# Patient Record
Sex: MALE | Race: WHITE | NOT HISPANIC OR LATINO | Employment: FULL TIME | URBAN - METROPOLITAN AREA
[De-identification: names, ages, dates, MRNs, and addresses within clinical notes are randomized per-mention and may not be internally consistent; named-entity substitution may affect disease eponyms.]

---

## 2017-09-20 ENCOUNTER — OFFICE VISIT (OUTPATIENT)
Dept: FAMILY MEDICINE CLINIC | Facility: CLINIC | Age: 42
End: 2017-09-20

## 2017-09-20 VITALS
TEMPERATURE: 98.5 F | SYSTOLIC BLOOD PRESSURE: 154 MMHG | HEART RATE: 87 BPM | HEIGHT: 70 IN | WEIGHT: 206 LBS | DIASTOLIC BLOOD PRESSURE: 78 MMHG | OXYGEN SATURATION: 97 % | BODY MASS INDEX: 29.49 KG/M2

## 2017-09-20 DIAGNOSIS — Z00.00 ROUTINE GENERAL MEDICAL EXAMINATION AT A HEALTH CARE FACILITY: ICD-10-CM

## 2017-09-20 DIAGNOSIS — F17.200 SMOKING: ICD-10-CM

## 2017-09-20 DIAGNOSIS — I10 ESSENTIAL HYPERTENSION: Primary | ICD-10-CM

## 2017-09-20 PROCEDURE — 99406 BEHAV CHNG SMOKING 3-10 MIN: CPT | Performed by: NURSE PRACTITIONER

## 2017-09-20 PROCEDURE — 99203 OFFICE O/P NEW LOW 30 MIN: CPT | Performed by: NURSE PRACTITIONER

## 2017-09-20 RX ORDER — CEPHRADINE 500 MG
2000 CAPSULE ORAL DAILY
COMMUNITY
End: 2022-02-09

## 2017-09-20 NOTE — PROGRESS NOTES
Subjective   Guy Sanchez is a 42 y.o. male. Establish care. He is here to discuss hypertension. He was taking lisinopril but has not taking in about a year because he has not had a new PCP due to moving. He thinks his blood pressure stays down most of the time but today it's a little high. He lives a mostly healthy lifestyle and tries to eat pretty well. He does eat a lot of protein because he does work out pretty intensely. He just recently added cardio back into his routine. He does take Creatine and L-Arginine to help bulk up a little. He is at the weight now he prefers so is ok to stop those now. He does drink a lot of water.   Does smoke and drinks occasional alcohol.     Hypertension   This is a new problem. The problem is unchanged. The problem is uncontrolled. Pertinent negatives include no blurred vision, headaches, peripheral edema or shortness of breath. Associated agents: Creatine. Risk factors for coronary artery disease include male gender, family history and smoking/tobacco exposure. Past treatments include ACE inhibitors. There are no compliance problems.         The following portions of the patient's history were reviewed and updated as appropriate: allergies, current medications, past family history, past medical history, past social history, past surgical history and problem list.    Review of Systems   Constitutional: Negative.    HENT: Negative.    Eyes: Negative.  Negative for blurred vision.   Respiratory: Negative.  Negative for shortness of breath.    Cardiovascular: Negative.    Gastrointestinal: Negative.    Endocrine: Negative.    Genitourinary:        Hx: Kidney stones     Musculoskeletal:        Occasional bilateral shoulder pain but lifts weights and plays volleyball   Skin: Negative.    Allergic/Immunologic: Negative.    Neurological: Negative.  Negative for headaches.   Hematological: Negative.    Psychiatric/Behavioral: Negative.        Objective   Physical Exam   Constitutional:  "He is oriented to person, place, and time. Vital signs are normal. He appears well-developed and well-nourished. He is cooperative.   HENT:   Right Ear: Hearing and tympanic membrane normal.   Left Ear: Hearing and tympanic membrane normal.   Nose: Nose normal.   Mouth/Throat: Uvula is midline, oropharynx is clear and moist and mucous membranes are normal.   Eyes: Conjunctivae, EOM and lids are normal. Pupils are equal, round, and reactive to light.   Neck: Trachea normal and full passive range of motion without pain. No thyroid mass and no thyromegaly present.   Cardiovascular: Normal rate, regular rhythm, normal heart sounds, intact distal pulses and normal pulses.    Pulmonary/Chest: Effort normal and breath sounds normal.   Abdominal: Soft. Normal appearance and bowel sounds are normal.   Musculoskeletal: Normal range of motion.   Neurological: He is alert and oriented to person, place, and time. He has normal strength.   Skin: Skin is warm, dry and intact.   Psychiatric: He has a normal mood and affect. His speech is normal and behavior is normal. Judgment and thought content normal. Cognition and memory are normal.   Nursing note and vitals reviewed.    Vitals:    09/20/17 1000   BP: 154/78   Pulse: 87   Temp: 98.5 °F (36.9 °C)   TempSrc: Oral   SpO2: 97%   Weight: 206 lb (93.4 kg)   Height: 70\" (177.8 cm)       Assessment/Plan   Diagnoses and all orders for this visit:    Essential hypertension    Routine general medical examination at a health care facility  -     CBC & Differential  -     Comprehensive Metabolic Panel  -     Hemoglobin A1c  -     Lipid Panel  -     TSH  -     Vitamin D 25 Hydroxy    Smoking      Hypertension: Will check some labs. We have discussed treatment. For now he will monitor the blood pressure, stop the creatine, cardio exercise and call the results of monitoring back to the office in about 30 days. If blood pressure improves, will stay off the medication. If elevated still, will " restart the Lisinopril.   He was encouraged to stop smoking but admits he is not ready to quit. Discussed smoking cessation and methods for quitting.  The patient is sincerely urged to quit smoking. The numerous direct health benefits are discussed. If he decides to quit, there are a number of helpful adjunctive aids, and he can see me to discuss nicotine replacement therapy and bupropion anytime in the future.  He had negative Hep C screening about 8 years ago so no need to repeat.  Follow up in one month

## 2020-01-21 ENCOUNTER — OFFICE VISIT CONVERTED (OUTPATIENT)
Dept: FAMILY MEDICINE CLINIC | Age: 45
End: 2020-01-21
Attending: NURSE PRACTITIONER

## 2020-01-24 ENCOUNTER — HOSPITAL ENCOUNTER (OUTPATIENT)
Dept: OTHER | Facility: HOSPITAL | Age: 45
Discharge: HOME | End: 2020-01-24
Attending: NURSE PRACTITIONER

## 2020-02-26 ENCOUNTER — OFFICE VISIT CONVERTED (OUTPATIENT)
Dept: FAMILY MEDICINE CLINIC | Age: 45
End: 2020-02-26
Attending: NURSE PRACTITIONER

## 2021-05-18 NOTE — PROGRESS NOTES
Guy Sanchez  1975     Office/Outpatient Visit    Visit Date: Wed, Feb 26, 2020 01:25 pm    Provider: Heaven Greenwood N.P. (Assistant: Christie Sears MA)    Location: [facility]        Electronically signed by Heaven Greenwood N.P. on  02/27/2020 01:11:27 PM                             Subjective:        CC: Mr. Sanchez is a 44 year old White male.  release statement for work PT STATES HE ISNT TAKING BACLOFEN         HPI:           onset about 6 wks ago.  no specific injury.  is attending PT (last visit this am).  has improved slowly.  muscle relaxers never helped.  is ready to return to work monday.  needs not to return to work.  I have talked with washington feliciano with [facility] who states in her opinion ok to return to work next week.  if starts to worsen again then MRI cervical spine would be recommended.  pt is agreeable with plan.  job involves heavy lifting at times and no light duty option for his work place.            Essential (primary) hypertension details; not currently on tx.  tx in the past with lisinorpril/ hctz per dr veras and did well in the past on that medication.      ROS:     CONSTITUTIONAL:  Negative for chills, fatigue, fever, and weight change.      CARDIOVASCULAR:  Negative for chest pain, palpitations, tachycardia, orthopnea, and edema.      RESPIRATORY:  Negative for cough, dyspnea, and hemoptysis.      MUSCULOSKELETAL:  Positive for arthralgias (neck improving).      NEUROLOGICAL:  Positive for paresthesia ( left arm and hand greatly improved ).      PSYCHIATRIC:  Negative for anxiety, depression, and sleep disturbances.          Past Medical History / Family History / Social History:         Last Reviewed on 1/21/2020 08:05 PM by Heaven Greenwood    Past Medical History:             PAST MEDICAL HISTORY         Hypertension         Surgical History:         Tonsillectomy/Adenoidectomy     Pressure Equalization Tubes         Social History:     Occupation: [employer]     Marital Status:      Children: 1 child         Tobacco/Alcohol/Supplements:     Last Reviewed on 1/21/2020 05:57 PM by Meenu Allen    Tobacco: Current Smoker: He currently smokes every day, 1 pack per day.          Current Problems:     Last Reviewed on 1/21/2020 08:05 PM by Heaven Greenwood    Cervicalgia    Encounter for screening for depression        Immunizations:     influenza, injectable, quadrivalent, preservative free 10/16/2019        Current Medications:     Last Reviewed on 2/26/2020 01:25 PM by Christie Sears    baclofen 10 mg oral tablet [take 1 tablet (10 mg) by oral route 3 times per day prn muscle pain]    etodolac 200 mg oral capsule [take 1 capsule (200 mg) by oral route 2 times per day with food]        Objective:        Vitals:         Historical:     1/21/2020  BP:   170/92 mm Hg ( (left arm, , sitting, );) 1/21/2020  BP:   168/96 mm Hg ( (right arm, , sitting, );) 1/21/2020  Wt:   209.4lbs    Current: 2/26/2020 1:30:36 PM    Ht:  5 ft, 10.5 in;  Wt: 210.6 lbs;  BMI: 29.8T: 98.1 F (oral);  BP: 156/98 mm Hg (right arm, sitting);  P: 75 bpm (right arm (BP Cuff), sitting)        Repeat:     1:31:19 PM  BP:   158/96mm Hg (right arm, sitting, P-75)     Exams:     PHYSICAL EXAM:     GENERAL:  well developed and nourished; appropriately groomed; in no apparent distress;     RESPIRATORY: normal respiratory rate and pattern with no distress; normal breath sounds with no rales, rhonchi, wheezes or rubs;     CARDIOVASCULAR: normal rate; rhythm is regular;  no edema;     MUSCULOSKELETAL:  Normal range of motion, strength and tone;     NEUROLOGIC: mental status: alert and oriented x 3; GROSSLY INTACT     PSYCHIATRIC:  appropriate affect and demeanor; normal speech pattern; grossly normal memory;         Assessment:         M54.2   Cervicalgia       I10   Essential (primary) hypertension           ORDERS:         Meds Prescribed:       [New Rx] lisinopriL-hydrochlorothiazide 10-12.5  mg oral tablet [take 1 tablet by oral route once daily], #30 (thirty) tablets, Refills: 3 (three)                 Plan: GLUCOSE/ CHOLESTEROL CHECK AT WORK ONCE PER YEAR        Cervicalgiareturn to work monday with no restrictions. lj        RECOMMENDATIONS given include: he will call if symptoms increase and will consider MRI cervical spine or further time off work prn..          Essential (primary) hypertension        RECOMMENDATIONS given include: avoid pseudoephedrine or other stimulants/decongestants in common cold remedies, decrease consumption of alcohol, perform routine monitoring of blood pressure with home blood pressure cuff, exercise, reduction of dietary salt intake, and take medication as prescribed, try not to miss doses.      FOLLOW-UP: Schedule a follow-up visit in 1 month.            Prescriptions:       [New Rx] lisinopriL-hydrochlorothiazide 10-12.5 mg oral tablet [take 1 tablet by oral route once daily], #30 (thirty) tablets, Refills: 3 (three)             Patient Recommendations:        For  Essential (primary) hypertension:    Certain decongestants and stimulants (like pseudoephedrine) in common cold remedies raise blood pressure, sometimes to dangerously high levels. Never take with MAO inhibitors! Avoid alcohol as it can contribute to elevated blood pressure. Begin monitoring your blood pressure by brief nurse visits at our office, a home blood pressure monitor, or by checking on the machines in pharmacies or stores.  Keep a log of the readings. Maintain a regular exercise program. Reduce the amount of salt in your diet.  Schedule a follow-up visit in 1 month.              Charge Capture:         Primary Diagnosis:     M54.2  Cervicalgia           Orders:      46551  Office/outpatient visit; established patient, level 3  (In-House)              I10  Essential (primary) hypertension
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
## 2021-07-02 VITALS
TEMPERATURE: 98.1 F | HEIGHT: 71 IN | BODY MASS INDEX: 29.48 KG/M2 | WEIGHT: 210.6 LBS | DIASTOLIC BLOOD PRESSURE: 96 MMHG | HEART RATE: 75 BPM | SYSTOLIC BLOOD PRESSURE: 158 MMHG

## 2021-07-02 VITALS
TEMPERATURE: 97.3 F | DIASTOLIC BLOOD PRESSURE: 92 MMHG | SYSTOLIC BLOOD PRESSURE: 170 MMHG | HEIGHT: 71 IN | HEART RATE: 80 BPM | BODY MASS INDEX: 29.31 KG/M2 | WEIGHT: 209.4 LBS

## 2022-02-09 ENCOUNTER — HOSPITAL ENCOUNTER (OUTPATIENT)
Dept: GENERAL RADIOLOGY | Facility: HOSPITAL | Age: 47
Discharge: HOME | End: 2022-02-09
Admitting: NURSE PRACTITIONER

## 2022-02-09 ENCOUNTER — OFFICE VISIT (OUTPATIENT)
Dept: FAMILY MEDICINE CLINIC | Facility: CLINIC | Age: 47
End: 2022-02-09

## 2022-02-09 VITALS
SYSTOLIC BLOOD PRESSURE: 180 MMHG | DIASTOLIC BLOOD PRESSURE: 107 MMHG | HEART RATE: 82 BPM | BODY MASS INDEX: 30.92 KG/M2 | WEIGHT: 216 LBS | HEIGHT: 70 IN | OXYGEN SATURATION: 99 %

## 2022-02-09 DIAGNOSIS — Z76.89 ESTABLISHING CARE WITH NEW DOCTOR, ENCOUNTER FOR: Primary | ICD-10-CM

## 2022-02-09 DIAGNOSIS — Z72.0 TOBACCO ABUSE: ICD-10-CM

## 2022-02-09 DIAGNOSIS — Z11.59 NEED FOR HEPATITIS C SCREENING TEST: ICD-10-CM

## 2022-02-09 DIAGNOSIS — Z01.89 ROUTINE LAB DRAW: ICD-10-CM

## 2022-02-09 DIAGNOSIS — Z13.29 SCREENING FOR THYROID DISORDER: ICD-10-CM

## 2022-02-09 DIAGNOSIS — I10 ESSENTIAL HYPERTENSION: ICD-10-CM

## 2022-02-09 DIAGNOSIS — R31.9 HEMATURIA, UNSPECIFIED TYPE: ICD-10-CM

## 2022-02-09 DIAGNOSIS — Z12.5 SCREENING FOR PROSTATE CANCER: ICD-10-CM

## 2022-02-09 DIAGNOSIS — Z13.220 SCREENING FOR LIPID DISORDERS: ICD-10-CM

## 2022-02-09 LAB
BILIRUB BLD-MCNC: NEGATIVE MG/DL
CLARITY, POC: ABNORMAL
COLOR UR: ABNORMAL
EXPIRATION DATE: ABNORMAL
GLUCOSE UR STRIP-MCNC: NEGATIVE MG/DL
KETONES UR QL: NEGATIVE
LEUKOCYTE EST, POC: ABNORMAL
Lab: ABNORMAL
NITRITE UR-MCNC: NEGATIVE MG/ML
PH UR: 6.5 [PH] (ref 5–8)
PROT UR STRIP-MCNC: ABNORMAL MG/DL
RBC # UR STRIP: ABNORMAL /UL
SP GR UR: 1.02 (ref 1–1.03)
UROBILINOGEN UR QL: NORMAL

## 2022-02-09 PROCEDURE — 87086 URINE CULTURE/COLONY COUNT: CPT | Performed by: NURSE PRACTITIONER

## 2022-02-09 PROCEDURE — 74018 RADEX ABDOMEN 1 VIEW: CPT

## 2022-02-09 PROCEDURE — 81003 URINALYSIS AUTO W/O SCOPE: CPT | Performed by: NURSE PRACTITIONER

## 2022-02-09 PROCEDURE — 99204 OFFICE O/P NEW MOD 45 MIN: CPT | Performed by: NURSE PRACTITIONER

## 2022-02-09 RX ORDER — MULTIVIT WITH MINERALS/LUTEIN
1000 TABLET ORAL DAILY
COMMUNITY

## 2022-02-09 RX ORDER — NITROFURANTOIN 25; 75 MG/1; MG/1
100 CAPSULE ORAL 2 TIMES DAILY
Qty: 14 CAPSULE | Refills: 0 | Status: SHIPPED | OUTPATIENT
Start: 2022-02-09

## 2022-02-09 RX ORDER — LISINOPRIL 10 MG/1
10 TABLET ORAL DAILY
Qty: 30 TABLET | Refills: 1 | Status: SHIPPED | OUTPATIENT
Start: 2022-02-09

## 2022-02-09 NOTE — PROGRESS NOTES
Chief Complaint  Establish Care, Blood in Urine (aches on left lower back ), and Hypertension (Patient is currently not on medication for this. He used to take Lisinopril )    Subjective          Guy JULIA Sanchez Jr presents to [facility]  History of Present Illness  Establish Care,       Hypertension (Patient is currently not on medication for this. He used to take Lisinopril. He does check his b/p 1-2 times a week 140-150s/90s. He denies any dizziness, chest pain, headache or edema.     Tobacco abuse-currently smoking 1ppd for 20 years.     Hematuria-pt noticed blood in the urine about a week ago. Pt does c/o pain on the right flank area. He denies any dysuria. He does report frequency, he denies any urgency or trouble with stream. He does have a history of kidney stones times 2 which he past.pt states he did take two days worth of antibiotics (4pills daily) patient does admit that he was drinking a lot of Monster energy drinks and coffee.  He has since switched over to water.  He states his urine is looking better however he is still having the left flank pain.          Past Medical History:   Diagnosis Date   • Hypertension    • Kidney stones          No Known Allergies       History reviewed. No pertinent surgical history.       Social History     Tobacco Use   • Smoking status: Current Every Day Smoker     Packs/day: 1.00     Years: 20.00     Pack years: 20.00     Types: Cigarettes   • Smokeless tobacco: Never Used   Substance Use Topics   • Alcohol use: Yes     Comment: occasionally          Family History   Problem Relation Age of Onset   • Hypertension Father    • Hypertension Paternal Grandmother    • Heart attack Paternal Grandfather           Current Outpatient Medications on File Prior to Visit   Medication Sig   • ascorbic acid (VITAMIN C) 1000 MG tablet Take 1,000 mg by mouth Daily.   • [DISCONTINUED] CREATINE PO Take 1 tablet by mouth Daily.   • [DISCONTINUED]  "L-Arginine 1000 MG tablet Take 2,000 mg by mouth Daily.     No current facility-administered medications on file prior to visit.           There is no immunization history on file for this patient.      BP (!) 180/107   Pulse 82   Ht 177.8 cm (70\")   Wt 98 kg (216 lb)   SpO2 99%   BMI 30.99 kg/m²             Physical Exam  Vitals reviewed.   Constitutional:       Appearance: Normal appearance. He is well-developed.   HENT:      Head: Normocephalic and atraumatic.      Right Ear: External ear normal.      Left Ear: External ear normal.      Mouth/Throat:      Pharynx: No oropharyngeal exudate.   Eyes:      Conjunctiva/sclera: Conjunctivae normal.      Pupils: Pupils are equal, round, and reactive to light.   Neck:      Vascular: No carotid bruit.   Cardiovascular:      Rate and Rhythm: Normal rate and regular rhythm.      Heart sounds: No murmur heard.  No friction rub. No gallop.    Pulmonary:      Effort: Pulmonary effort is normal.      Breath sounds: Normal breath sounds. No wheezing or rhonchi.   Abdominal:      Tenderness: There is left CVA tenderness.   Skin:     General: Skin is warm and dry.   Neurological:      Mental Status: He is alert and oriented to person, place, and time.      Cranial Nerves: No cranial nerve deficit.   Psychiatric:         Mood and Affect: Mood and affect normal.         Behavior: Behavior normal.         Thought Content: Thought content normal.         Judgment: Judgment normal.             Result Review :     The following data was reviewed by: DAVON Jameson on 02/09/2022:  POCT urinalysis dipstick, automated  Order: [id number]   Status: Final result     Visible to patient: No (scheduled for 2/9/2022 10:11 PM)     Next appt: None     Dx: Hematuria, unspecified type    Specimen Information: Urine         0 Result Notes    Component   Ref Range & Units 08:10   Color   Yellow, Straw, Dark Yellow, Pao Dark Yellow    Clarity, UA   Clear Cloudy Abnormal     Specific Gravity "    1.005 - 1.030 1.025    pH, Urine   5.0 - 8.0 6.5    Leukocytes   Negative Trace Abnormal     Nitrite, UA   Negative Negative    Protein, POC   Negative mg/dL 100 mg/dL Abnormal     Glucose, UA   Negative, 1000 mg/dL (3+) mg/dL Negative    Ketones, UA   Negative Negative    Urobilinogen, UA   Normal Normal    Bilirubin   Negative Negative    Blood, UA   Negative Large Abnormal     Lot Number  [id number]    Expiration Date  10/31/2022    Resulting Agency [facility]              Specimen Collected: 02/09/22 08:10 Last Resulted: 02/09/22 08:10        Lab Flowsheet     Order Details     View Encounter     Lab and Collection Details     Routing                                 Assessment and Plan      Diagnoses and all orders for this visit:    1. Establishing care with new doctor, encounter for (Primary)    2. Routine lab draw  -     Comprehensive Metabolic Panel; Future  -     CBC & Differential; Future  -     TSH; Future  -     Lipid Panel; Future    3. Screening for lipid disorders  -     Lipid Panel; Future    4. Screening for thyroid disorder  -     TSH; Future    5. Screening for prostate cancer  -     PSA Screen; Future    6. Need for hepatitis C screening test  -     Hepatitis C antibody; Future    7. Hematuria, unspecified type  Comments:  Macrobid 100 mg twice daily, side effects administration addressed.  Will check KUB.  Patient advised to follow-up in 2 weeks to recheck UA.  Consider CT scan  Orders:  -     POCT urinalysis dipstick, automated  -     Urine Culture - Urine, Urine, Clean Catch; Future  -     XR Abdomen KUB; Future  -     nitrofurantoin, macrocrystal-monohydrate, (Macrobid) 100 MG capsule; Take 1 capsule by mouth 2 (Two) Times a Day.  Dispense: 14 capsule; Refill: 0  -     Urine Culture - Urine, Urine, Clean Catch    8. Tobacco abuse    9. Essential hypertension  Comments:  Not at goal, restart lisinopril 10 mg daily.  Side effects administration addressed.  Patient  advised to monitor BP at home.  Orders:  -     lisinopril (PRINIVIL,ZESTRIL) 10 MG tablet; Take 1 tablet by mouth Daily.  Dispense: 30 tablet; Refill: 1      Guy DUMONT Wivirgie Delgadillo  reports that he has been smoking cigarettes. He has a 20.00 pack-year smoking history. He has never used smokeless tobacco.. I have educated him on the risk of diseases from using tobacco products such as cancer, COPD and heart disease.     I advised him to quit and he is not willing to quit.    I spent 10 minutes counseling the patient.               Follow Up     Return in about 6 weeks (around 3/23/2022).    Patient was given instructions and counseling regarding his condition or for health maintenance advice. Please see specific information pulled into the AVS if appropriate.

## 2022-02-11 LAB — BACTERIA SPEC AEROBE CULT: NO GROWTH

## 2022-02-14 ENCOUNTER — TELEPHONE (OUTPATIENT)
Dept: FAMILY MEDICINE CLINIC | Facility: CLINIC | Age: 47
End: 2022-02-14

## 2022-02-15 ENCOUNTER — TELEPHONE (OUTPATIENT)
Dept: FAMILY MEDICINE CLINIC | Facility: CLINIC | Age: 47
End: 2022-02-15

## 2022-02-15 DIAGNOSIS — R31.9 PAINLESS HEMATURIA: Primary | ICD-10-CM

## 2022-02-22 ENCOUNTER — TELEPHONE (OUTPATIENT)
Dept: FAMILY MEDICINE CLINIC | Facility: CLINIC | Age: 47
End: 2022-02-22

## 2022-03-01 NOTE — TELEPHONE ENCOUNTER
----- Message from DAVON Jameson sent at 2/9/2022 10:32 AM EST -----  Kidney stone noted over left kidney, nonobstructing.  Increase fluids.

## 2022-03-01 NOTE — TELEPHONE ENCOUNTER
----- Message from DAVON Jameson sent at 2/14/2022  2:00 PM EST -----  Negative urine culture, patient needs to be scheduled for a CT abdomen and pelvis with IV contrast with diagnosis of painless hematuria.

## 2022-04-22 PROCEDURE — 82365 CALCULUS SPECTROSCOPY: CPT | Performed by: UROLOGY

## 2022-04-23 ENCOUNTER — LAB REQUISITION (OUTPATIENT)
Dept: LAB | Facility: HOSPITAL | Age: 47
End: 2022-04-23

## 2022-04-23 DIAGNOSIS — N20.2 CALCULUS OF KIDNEY WITH CALCULUS OF URETER: ICD-10-CM

## 2022-04-28 LAB
CA H2 PHOS DIHYD MFR STONE: 50 %
COLOR STONE: NORMAL
COM MFR STONE: 10 %
COMPN STONE: NORMAL
HYDROXYAPATITE 24H ENGDIFF UR: 40 %
LABORATORY COMMENT REPORT: NORMAL
LABORATORY COMMENT REPORT: NORMAL
Lab: NORMAL
Lab: NORMAL
PHOTO: NORMAL
SIZE STONE: NORMAL MM
SPEC SOURCE SUBJ: NORMAL
WT STONE: 54 MG

## 2024-11-30 ENCOUNTER — HOSPITAL ENCOUNTER (EMERGENCY)
Facility: HOSPITAL | Age: 49
Discharge: HOME | End: 2024-11-30
Attending: EMERGENCY MEDICINE
Payer: COMMERCIAL

## 2024-11-30 ENCOUNTER — APPOINTMENT (OUTPATIENT)
Dept: GENERAL RADIOLOGY | Facility: HOSPITAL | Age: 49
End: 2024-11-30
Payer: COMMERCIAL

## 2024-11-30 VITALS
WEIGHT: 209.88 LBS | SYSTOLIC BLOOD PRESSURE: 188 MMHG | HEART RATE: 67 BPM | DIASTOLIC BLOOD PRESSURE: 120 MMHG | OXYGEN SATURATION: 96 % | TEMPERATURE: 98.2 F | HEIGHT: 70 IN | BODY MASS INDEX: 30.05 KG/M2 | RESPIRATION RATE: 19 BRPM

## 2024-11-30 DIAGNOSIS — I10 ESSENTIAL HYPERTENSION: ICD-10-CM

## 2024-11-30 DIAGNOSIS — I10 UNCONTROLLED HYPERTENSION: Primary | ICD-10-CM

## 2024-11-30 DIAGNOSIS — J06.9 UPPER RESPIRATORY TRACT INFECTION, UNSPECIFIED TYPE: ICD-10-CM

## 2024-11-30 LAB
FLUAV SUBTYP SPEC NAA+PROBE: NOT DETECTED
FLUBV RNA ISLT QL NAA+PROBE: NOT DETECTED
HOLD SPECIMEN: NORMAL
HOLD SPECIMEN: NORMAL
RSV RNA NPH QL NAA+NON-PROBE: NOT DETECTED
SARS-COV-2 RNA RESP QL NAA+PROBE: NOT DETECTED
WHOLE BLOOD HOLD COAG: NORMAL
WHOLE BLOOD HOLD SPECIMEN: NORMAL

## 2024-11-30 PROCEDURE — 87637 SARSCOV2&INF A&B&RSV AMP PRB: CPT | Performed by: EMERGENCY MEDICINE

## 2024-11-30 PROCEDURE — 71045 X-RAY EXAM CHEST 1 VIEW: CPT

## 2024-11-30 PROCEDURE — 25010000002 ONDANSETRON PER 1 MG: Performed by: EMERGENCY MEDICINE

## 2024-11-30 PROCEDURE — 96375 TX/PRO/DX INJ NEW DRUG ADDON: CPT

## 2024-11-30 PROCEDURE — 96374 THER/PROPH/DIAG INJ IV PUSH: CPT

## 2024-11-30 PROCEDURE — 99285 EMERGENCY DEPT VISIT HI MDM: CPT

## 2024-11-30 PROCEDURE — 25010000002 LABETALOL 5 MG/ML SOLUTION: Performed by: EMERGENCY MEDICINE

## 2024-11-30 PROCEDURE — 96376 TX/PRO/DX INJ SAME DRUG ADON: CPT

## 2024-11-30 PROCEDURE — 99283 EMERGENCY DEPT VISIT LOW MDM: CPT

## 2024-11-30 PROCEDURE — 25010000002 HYDRALAZINE PER 20 MG: Performed by: EMERGENCY MEDICINE

## 2024-11-30 RX ORDER — LISINOPRIL 10 MG/1
20 TABLET ORAL ONCE
Status: COMPLETED | OUTPATIENT
Start: 2024-11-30 | End: 2024-11-30

## 2024-11-30 RX ORDER — ACETAMINOPHEN 500 MG
1000 TABLET ORAL ONCE
Status: COMPLETED | OUTPATIENT
Start: 2024-11-30 | End: 2024-11-30

## 2024-11-30 RX ORDER — DOXYCYCLINE 100 MG/1
100 CAPSULE ORAL 2 TIMES DAILY
Qty: 14 CAPSULE | Refills: 0 | Status: SHIPPED | OUTPATIENT
Start: 2024-11-30

## 2024-11-30 RX ORDER — ONDANSETRON 2 MG/ML
4 INJECTION INTRAMUSCULAR; INTRAVENOUS ONCE
Status: COMPLETED | OUTPATIENT
Start: 2024-11-30 | End: 2024-11-30

## 2024-11-30 RX ORDER — HYDRALAZINE HYDROCHLORIDE 20 MG/ML
10 INJECTION INTRAMUSCULAR; INTRAVENOUS ONCE
Status: COMPLETED | OUTPATIENT
Start: 2024-11-30 | End: 2024-11-30

## 2024-11-30 RX ORDER — PREDNISONE 50 MG/1
50 TABLET ORAL DAILY
Qty: 5 TABLET | Refills: 0 | Status: SHIPPED | OUTPATIENT
Start: 2024-11-30 | End: 2024-12-05

## 2024-11-30 RX ORDER — SODIUM CHLORIDE 0.9 % (FLUSH) 0.9 %
10 SYRINGE (ML) INJECTION AS NEEDED
Status: DISCONTINUED | OUTPATIENT
Start: 2024-11-30 | End: 2024-11-30 | Stop reason: HOSPADM

## 2024-11-30 RX ORDER — LISINOPRIL 20 MG/1
20 TABLET ORAL DAILY
Qty: 30 TABLET | Refills: 0 | Status: SHIPPED | OUTPATIENT
Start: 2024-11-30 | End: 2024-12-30

## 2024-11-30 RX ORDER — LABETALOL HYDROCHLORIDE 5 MG/ML
10 INJECTION, SOLUTION INTRAVENOUS ONCE
Status: COMPLETED | OUTPATIENT
Start: 2024-11-30 | End: 2024-11-30

## 2024-11-30 RX ORDER — HYDRALAZINE HYDROCHLORIDE 25 MG/1
25 TABLET, FILM COATED ORAL 2 TIMES DAILY PRN
Qty: 30 TABLET | Refills: 0 | Status: SHIPPED | OUTPATIENT
Start: 2024-11-30

## 2024-11-30 RX ADMIN — HYDRALAZINE HYDROCHLORIDE 10 MG: 20 INJECTION INTRAMUSCULAR; INTRAVENOUS at 12:58

## 2024-11-30 RX ADMIN — LABETALOL HYDROCHLORIDE 10 MG: 5 INJECTION, SOLUTION INTRAVENOUS at 14:56

## 2024-11-30 RX ADMIN — LISINOPRIL 20 MG: 10 TABLET ORAL at 12:57

## 2024-11-30 RX ADMIN — HYDRALAZINE HYDROCHLORIDE 10 MG: 20 INJECTION INTRAMUSCULAR; INTRAVENOUS at 12:09

## 2024-11-30 RX ADMIN — ACETAMINOPHEN 1000 MG: 500 TABLET ORAL at 13:39

## 2024-11-30 RX ADMIN — ONDANSETRON 4 MG: 2 INJECTION INTRAMUSCULAR; INTRAVENOUS at 13:39

## 2024-11-30 NOTE — ED PROVIDER NOTES
Time: 11:35 AM EST  Date of encounter:  11/30/2024  Independent Historian/Clinical History and Information was obtained by:   Patient    History is limited by: N/A    Chief Complaint: Cough, congestion.  Sent by urgent care for uncontrolled hypertension      History of Present Illness:  Patient is a 49 y.o. year old male who presents to the emergency department for evaluation of cough and congestion for the past few days.  Afebrile.  Patient states he feels like he has infection in his upper chest area.  Has been taking over-the-counter medications.  States this has been persistent for the past 2 weeks.  Presents to urgent care, however they sent him here due to extremely high blood pressure.  The patient notes that it has been a few months since he taken his medications for blood pressure.      Patient Care Team  Primary Care Provider: Angela Ruff APRN    Past Medical History:     No Known Allergies  Past Medical History:   Diagnosis Date    Hypertension     Kidney stones      History reviewed. No pertinent surgical history.  Family History   Problem Relation Age of Onset    Hypertension Father     Hypertension Paternal Grandmother     Heart attack Paternal Grandfather        Home Medications:  Prior to Admission medications    Medication Sig Start Date End Date Taking? Authorizing Provider   ascorbic acid (VITAMIN C) 1000 MG tablet Take 1,000 mg by mouth Daily.  Patient not taking: Reported on 11/30/2024    Provider, MD Marciano   lisinopril (PRINIVIL,ZESTRIL) 10 MG tablet Take 1 tablet by mouth Daily.  Patient not taking: Reported on 11/30/2024 2/9/22   Angela Ruff APRN   nitrofurantoin, macrocrystal-monohydrate, (Macrobid) 100 MG capsule Take 1 capsule by mouth 2 (Two) Times a Day.  Patient not taking: Reported on 11/30/2024 2/9/22   Angela Ruff APRN        Social History:   Social History     Tobacco Use    Smoking status: Every Day     Current packs/day: 1.00     Average packs/day: 1  "pack/day for 20.0 years (20.0 ttl pk-yrs)     Types: Cigarettes    Smokeless tobacco: Never   Substance Use Topics    Alcohol use: Yes     Comment: occasionally     Drug use: No         Review of Systems:  Review of Systems   Constitutional:  Negative for chills and fever.   HENT:  Positive for congestion. Negative for rhinorrhea and sore throat.    Eyes:  Negative for photophobia.   Respiratory:  Positive for cough. Negative for apnea, chest tightness and shortness of breath.    Cardiovascular:  Negative for chest pain and palpitations.   Gastrointestinal:  Negative for abdominal pain, diarrhea, nausea and vomiting.   Endocrine: Negative.    Genitourinary:  Negative for difficulty urinating and dysuria.   Musculoskeletal:  Negative for back pain, joint swelling and myalgias.   Skin:  Negative for color change and wound.   Allergic/Immunologic: Negative.    Neurological:  Negative for seizures and headaches.   Psychiatric/Behavioral: Negative.     All other systems reviewed and are negative.       Physical Exam:  BP (!) 188/120 (BP Location: Right arm, Patient Position: Lying)   Pulse 67   Temp 98.2 °F (36.8 °C) (Oral)   Resp 19   Ht 177.8 cm (70\")   Wt 95.2 kg (209 lb 14.1 oz)   SpO2 96%   BMI 30.11 kg/m²     Physical Exam  Vitals and nursing note reviewed.   Constitutional:       General: He is awake.      Appearance: Normal appearance.   HENT:      Head: Normocephalic and atraumatic.      Nose: Nose normal.      Mouth/Throat:      Mouth: Mucous membranes are moist.   Eyes:      Extraocular Movements: Extraocular movements intact.      Pupils: Pupils are equal, round, and reactive to light.   Cardiovascular:      Rate and Rhythm: Normal rate and regular rhythm.      Heart sounds: Normal heart sounds.   Pulmonary:      Effort: Pulmonary effort is normal. No respiratory distress.      Breath sounds: Normal breath sounds. No wheezing, rhonchi or rales.   Abdominal:      General: Bowel sounds are normal.      " Palpations: Abdomen is soft.      Tenderness: There is no abdominal tenderness. There is no guarding or rebound.      Comments: No rigidity   Musculoskeletal:         General: No tenderness. Normal range of motion.      Cervical back: Normal range of motion and neck supple.   Skin:     General: Skin is warm and dry.      Coloration: Skin is not jaundiced.   Neurological:      General: No focal deficit present.      Mental Status: He is alert. Mental status is at baseline.   Psychiatric:         Mood and Affect: Mood normal.                  Procedures:  Procedures      Medical Decision Making:      Comorbidities that affect care:    Hypertension    External Notes reviewed:    Previous Operation Note: Outpatient surgery 1/12/2023 with [facility].  Description: Cystoscopy, bilateral ureteroscopy with stone extraction, bilateral stent exchange      The following orders were placed and all results were independently analyzed by me:  Orders Placed This Encounter   Procedures    COVID PRE-OP / PRE-PROCEDURE SCREENING ORDER (NO ISOLATION) - Swab, Nasopharynx    COVID-19, FLU A/B, RSV PCR 1 HR TAT - Swab, Nasopharynx    XR Chest 1 View    [place] Draw    Ambulatory Referral to Family Practice    Monitor Blood Pressure    Green Top (Gel)    Lavender Top    Gold Top - SST    Light Blue Top       Medications Given in the Emergency Department:  Medications   hydrALAZINE (APRESOLINE) injection 10 mg (10 mg Intravenous Given 11/30/24 1209)   lisinopril (PRINIVIL,ZESTRIL) tablet 20 mg (20 mg Oral Given 11/30/24 1257)   hydrALAZINE (APRESOLINE) injection 10 mg (10 mg Intravenous Given 11/30/24 1258)   acetaminophen (TYLENOL) tablet 1,000 mg (1,000 mg Oral Given 11/30/24 1339)   ondansetron (ZOFRAN) injection 4 mg (4 mg Intravenous Given 11/30/24 1339)   labetalol (NORMODYNE,TRANDATE) injection 10 mg (10 mg Intravenous Given 11/30/24 1456)        ED Course:         Labs:    Lab Results (last 24 hours)       Procedure  Component Value Units Date/Time    COVID PRE-OP / PRE-PROCEDURE SCREENING ORDER (NO ISOLATION) - Swab, Nasopharynx [[id number]]  (Normal) Collected: 11/30/24 1104    Specimen: Swab from Nasopharynx Updated: 11/30/24 1146    Narrative:      The following orders were created for panel order COVID PRE-OP / PRE-PROCEDURE SCREENING ORDER (NO ISOLATION) - Swab, Nasopharynx.  Procedure                               Abnormality         Status                     ---------                               -----------         ------                     COVID-19, FLU A/B, RSV P...[[id number]]  Normal              Final result                 Please view results for these tests on the individual orders.    COVID-19, FLU A/B, RSV PCR 1 HR TAT - Swab, Nasopharynx [[id number]]  (Normal) Collected: 11/30/24 1104    Specimen: Swab from Nasopharynx Updated: 11/30/24 1146     COVID19 Not Detected     Influenza A PCR Not Detected     Influenza B PCR Not Detected     RSV, PCR Not Detected    Narrative:      Fact sheet for providers: https://www.fda.gov/media/716313/download    Fact sheet for patients: https://www.fda.gov/media/695688/download    Test performed by PCR.             Imaging:    XR Chest 1 View    Result Date: 11/30/2024  XR CHEST 1 VW Date of Exam: 11/30/2024 11:58 AM EST Indication: Cough/congestion Comparison: None available. Findings: Unremarkable cardiomediastinal silhouette. No focal airspace consolidation. No pleural effusion or pneumothorax. No acute osseous abnormality.     Impression: No focal airspace consolidation. Electronically Signed: Matt Pack MD  11/30/2024 12:19 PM EST  Workstation ID: [id number]       Differential Diagnosis and Discussion:    Cough: Differential diagnosis includes but is not limited to pneumonia, acute bronchitis, upper respiratory infection, ACE inhibitor use, allergic reaction, epiglottitis, seasonal allergies, chemical irritants, exercise-induced asthma, viral syndrome.  Metabolic:  Differential diagnosis includes but is not limited to hypertension, hyperglycemia, hyperkalemia, hypocalcemia, metabolic acidosis, hypokalemia, hypoglycemia, malnutrition, hypothyroidism, hyperthyroidism, and adrenal insufficiency.     All labs were reviewed and interpreted by me.  All X-rays impressions were independently interpreted by me.    MDM           Total Critical Care time of 45 minutes. Total critical care time documented does not include time spent on separately billed procedures for services of nurses or physician assistants. I personally saw and examined the patient. I have reviewed all diagnostic interpretations and treatment plans as written. I was present for the key portions of any procedures performed and the inclusive time noted in any critical care statement. Critical care time includes patient management by me, time spent at the patients bedside,  time to review lab and imaging results, discussing patient care, documentation in the medical record, and time spent with family or caregiver.          Patient Care Considerations:    SEPSIS was considered but is NOT present in the emergency department as SIRS criteria is not present.      Consultants/Shared Management Plan:    None    Social Determinants of Health:    Patient is independent, reliable, and has access to care.       Disposition and Care Coordination:    Discharged: I considered escalation of care by admitting this patient to the hospital, however the patient has been off of his blood pressure medications for approximately 2 months.  He has only a mild headache but is otherwise asymptomatic.        Final diagnoses:   Uncontrolled hypertension   Upper respiratory tract infection, unspecified type   Essential hypertension        ED Disposition       ED Disposition   Discharge    Condition   Stable    Comment   --               This medical record created using voice recognition software.             Gage Keyes MD  11/30/24  [id number]

## 2024-11-30 NOTE — DISCHARGE INSTRUCTIONS
Return to the emergency department as needed for worsening of symptoms.  I did prescribe 2 separate blood pressure medications.  Lisinopril you will take every day as long as your blood pressure is greater than 130/80.  The second medication hydralazine will only be taken if your blood pressure is especially high despite your lisinopril.  Directions will be on the bottle.  Do not take this medication within 2 hours of your lisinopril.

## 2024-11-30 NOTE — ED TRIAGE NOTES
Patient to ED from / for HTN.   Patient originally went to u/ for congestion, cough and was told to come here due to hypertension.   Patient has been taking sudafed, mucinex, with no relief. Has not been compliant with hypertension medication, does not have pcp, has not taken meds in over a month.